# Patient Record
(demographics unavailable — no encounter records)

---

## 2024-10-30 NOTE — PHYSICAL EXAM
[Appropriately responsive] : appropriately responsive [Alert] : alert [No Acute Distress] : no acute distress [No Lymphadenopathy] : no lymphadenopathy [Regular Rate Rhythm] : regular rate rhythm [No Murmurs] : no murmurs [Clear to Auscultation B/L] : clear to auscultation bilaterally [Soft] : soft [Non-tender] : non-tender [Non-distended] : non-distended [No HSM] : No HSM [No Lesions] : no lesions [No Mass] : no mass [Oriented x3] : oriented x3 [FreeTextEntry1] : Normal, no lesions [FreeTextEntry2] : Normal, no lesions [FreeTextEntry4] : Normal, no lesions seen or palpated.  No discharge in vault [FreeTextEntry5] : Smooth, pink, no lesions.  No cervical motion tenderness [FreeTextEntry6] : Anteverted, small, mobile, nontender.  No adnexal masses or tenderness bilaterally

## 2024-10-30 NOTE — HISTORY OF PRESENT ILLNESS
[LMP unknown] : LMP unknown [postmenopausal] : postmenopausal [N] : Patient denies prior pregnancies [Currently Active] : currently active [Men] : men [No] : No [FreeTextEntry1] : Patient here for annual visit.  Is not having any gynecologic complaints. No VB. No breast lumps, pain or D/C. Patient has a long history of HPV positive Paps.  Her last colpo was in 2022. [Mammogramdate] : 02/24 [TextBox_19] : Z&P- with sono- Benign [BreastSonogramDate] : 02/24 [TextBox_25] : Z&P [PapSmeardate] : 10/23 [TextBox_31] : Negative/HPV POSITIVE [BoneDensityDate] : 02/23 [TextBox_37] : Z+P- Mild osteopenia with T ranging -1.2 to -1.8 [ColonoscopyDate] : 11/20 [LMPDate] :  [TextBox_36] : Long h/o HPV positive; last colpo 2022